# Patient Record
Sex: FEMALE | Race: WHITE
[De-identification: names, ages, dates, MRNs, and addresses within clinical notes are randomized per-mention and may not be internally consistent; named-entity substitution may affect disease eponyms.]

---

## 2018-06-04 ENCOUNTER — HOSPITAL ENCOUNTER (OUTPATIENT)
Dept: HOSPITAL 17 - PHED | Age: 43
Setting detail: OBSERVATION
LOS: 1 days | Discharge: HOME | End: 2018-06-05
Attending: SURGERY | Admitting: SURGERY
Payer: COMMERCIAL

## 2018-06-04 VITALS
SYSTOLIC BLOOD PRESSURE: 122 MMHG | HEART RATE: 92 BPM | RESPIRATION RATE: 16 BRPM | OXYGEN SATURATION: 97 % | DIASTOLIC BLOOD PRESSURE: 76 MMHG

## 2018-06-04 VITALS
SYSTOLIC BLOOD PRESSURE: 118 MMHG | HEART RATE: 87 BPM | OXYGEN SATURATION: 100 % | TEMPERATURE: 97.5 F | RESPIRATION RATE: 16 BRPM | DIASTOLIC BLOOD PRESSURE: 67 MMHG

## 2018-06-04 VITALS — WEIGHT: 124.34 LBS | BODY MASS INDEX: 19.52 KG/M2 | HEIGHT: 67 IN

## 2018-06-04 VITALS
HEART RATE: 69 BPM | DIASTOLIC BLOOD PRESSURE: 72 MMHG | RESPIRATION RATE: 18 BRPM | SYSTOLIC BLOOD PRESSURE: 120 MMHG | OXYGEN SATURATION: 100 %

## 2018-06-04 VITALS — HEART RATE: 115 BPM

## 2018-06-04 DIAGNOSIS — G47.00: ICD-10-CM

## 2018-06-04 DIAGNOSIS — N83.201: ICD-10-CM

## 2018-06-04 DIAGNOSIS — K35.80: Primary | ICD-10-CM

## 2018-06-04 DIAGNOSIS — F98.8: ICD-10-CM

## 2018-06-04 LAB
ALBUMIN SERPL-MCNC: 3.6 GM/DL (ref 3.4–5)
ALP SERPL-CCNC: 62 U/L (ref 45–117)
ALT SERPL-CCNC: 20 U/L (ref 10–53)
AST SERPL-CCNC: 14 U/L (ref 15–37)
BACTERIA #/AREA URNS HPF: (no result) /HPF
BASOPHILS # BLD AUTO: 0.1 TH/MM3 (ref 0–0.2)
BASOPHILS NFR BLD: 0.6 % (ref 0–2)
BILIRUB SERPL-MCNC: 0.4 MG/DL (ref 0.2–1)
BUN SERPL-MCNC: 10 MG/DL (ref 7–18)
CALCIUM SERPL-MCNC: 8.4 MG/DL (ref 8.5–10.1)
CHLORIDE SERPL-SCNC: 104 MEQ/L (ref 98–107)
COLOR UR: YELLOW
CREAT SERPL-MCNC: 0.56 MG/DL (ref 0.5–1)
EOSINOPHIL # BLD: 0 TH/MM3 (ref 0–0.4)
EOSINOPHIL NFR BLD: 0.1 % (ref 0–4)
ERYTHROCYTE [DISTWIDTH] IN BLOOD BY AUTOMATED COUNT: 13.3 % (ref 11.6–17.2)
GFR SERPLBLD BASED ON 1.73 SQ M-ARVRAT: 120 ML/MIN (ref 89–?)
GLUCOSE SERPL-MCNC: 125 MG/DL (ref 74–106)
GLUCOSE UR STRIP-MCNC: (no result) MG/DL
HCO3 BLD-SCNC: 24.7 MEQ/L (ref 21–32)
HCT VFR BLD CALC: 40 % (ref 35–46)
HGB BLD-MCNC: 13.4 GM/DL (ref 11.6–15.3)
HGB UR QL STRIP: (no result)
INR PPP: 1.1 RATIO
KETONES UR STRIP-MCNC: 40 MG/DL
LEUKOCYTE ESTERASE UR QL STRIP: (no result) /HPF (ref 0–5)
LYMPHOCYTES # BLD AUTO: 1 TH/MM3 (ref 1–4.8)
LYMPHOCYTES NFR BLD AUTO: 5.1 % (ref 9–44)
MCH RBC QN AUTO: 28.8 PG (ref 27–34)
MCHC RBC AUTO-ENTMCNC: 33.5 % (ref 32–36)
MCV RBC AUTO: 86.2 FL (ref 80–100)
MONOCYTE #: 0.3 TH/MM3 (ref 0–0.9)
MONOCYTES NFR BLD: 1.5 % (ref 0–8)
NEUTROPHILS # BLD AUTO: 19.1 TH/MM3 (ref 1.8–7.7)
NEUTROPHILS NFR BLD AUTO: 92.7 % (ref 16–70)
NITRITE UR QL STRIP: (no result)
PLATELET # BLD: 188 TH/MM3 (ref 150–450)
PMV BLD AUTO: 9 FL (ref 7–11)
PROT SERPL-MCNC: 6.7 GM/DL (ref 6.4–8.2)
PROTHROMBIN TIME: 10.7 SEC (ref 9.8–11.6)
RBC # BLD AUTO: 4.64 MIL/MM3 (ref 4–5.3)
RBC #/AREA URNS HPF: (no result) /HPF (ref 0–3)
SODIUM SERPL-SCNC: 137 MEQ/L (ref 136–145)
SP GR UR STRIP: (no result) (ref 1–1.03)
SQUAMOUS #/AREA URNS HPF: (no result) /HPF (ref 0–5)
URINE LEUKOCYTE ESTERASE: (no result)
WBC # BLD AUTO: 20.5 TH/MM3 (ref 4–11)

## 2018-06-04 PROCEDURE — 85610 PROTHROMBIN TIME: CPT

## 2018-06-04 PROCEDURE — 96375 TX/PRO/DX INJ NEW DRUG ADDON: CPT

## 2018-06-04 PROCEDURE — 00840 ANES IPER PX LOWER ABD NOS: CPT

## 2018-06-04 PROCEDURE — 96376 TX/PRO/DX INJ SAME DRUG ADON: CPT

## 2018-06-04 PROCEDURE — 83690 ASSAY OF LIPASE: CPT

## 2018-06-04 PROCEDURE — 84702 CHORIONIC GONADOTROPIN TEST: CPT

## 2018-06-04 PROCEDURE — 85730 THROMBOPLASTIN TIME PARTIAL: CPT

## 2018-06-04 PROCEDURE — 81001 URINALYSIS AUTO W/SCOPE: CPT

## 2018-06-04 PROCEDURE — 96365 THER/PROPH/DIAG IV INF INIT: CPT

## 2018-06-04 PROCEDURE — 99285 EMERGENCY DEPT VISIT HI MDM: CPT

## 2018-06-04 PROCEDURE — 44970 LAPAROSCOPY APPENDECTOMY: CPT

## 2018-06-04 PROCEDURE — 96361 HYDRATE IV INFUSION ADD-ON: CPT

## 2018-06-04 PROCEDURE — 94150 VITAL CAPACITY TEST: CPT

## 2018-06-04 PROCEDURE — 74177 CT ABD & PELVIS W/CONTRAST: CPT

## 2018-06-04 PROCEDURE — G0378 HOSPITAL OBSERVATION PER HR: HCPCS

## 2018-06-04 PROCEDURE — 88304 TISSUE EXAM BY PATHOLOGIST: CPT

## 2018-06-04 PROCEDURE — 80053 COMPREHEN METABOLIC PANEL: CPT

## 2018-06-04 PROCEDURE — 85025 COMPLETE CBC W/AUTO DIFF WBC: CPT

## 2018-06-04 RX ADMIN — PHENYTOIN SODIUM SCH MLS/HR: 50 INJECTION INTRAMUSCULAR; INTRAVENOUS at 14:20

## 2018-06-04 RX ADMIN — CLONIDINE HYDROCHLORIDE PRN MG: 0.1 INJECTION, SOLUTION EPIDURAL at 20:13

## 2018-06-04 RX ADMIN — PHENYTOIN SODIUM SCH MLS/HR: 50 INJECTION INTRAMUSCULAR; INTRAVENOUS at 18:25

## 2018-06-04 RX ADMIN — PHENYTOIN SODIUM SCH MLS/HR: 50 INJECTION INTRAMUSCULAR; INTRAVENOUS at 20:12

## 2018-06-04 NOTE — MH
cc:

Everette Schneider MD

****

 

 

DATE OF ADMISSION:

06/04/2018

 

REASON FOR ADMISSION:

Acute appendicitis.

 

HISTORY OF PRESENT ILLNESS:

The patient is a 42-year-old female who began to develop abdominal 

pain across the lower abdomen this morning.  It began in the 

periumbilical region and has now migrated to both lower abdomen.  The 

patient had nausea and emesis as well.  No change in bowel habits.  

She has no history of abdominal surgeries.  She takes Adderall for 

attention deficit disorder.  Last menstrual period, she has had 

intermittent spotting for the last 2 weeks.  She has had no previous 

surgeries.  She does not drink.  She does not smoke.  She does not use

other medications or substances.

 

ALLERGIES:

SHE HAS NO KNOWN ALLERGIES.

 

REVIEW OF SYSTEMS:

Negative except for GI, which is positive as indicated above.

 

PHYSICAL EXAMINATION:

GENERAL:  Exam reveals a  female, in no acute distress.

VITAL SIGNS:  /90, pulse 115, respirations 16, temperature 97.6,

100% saturation on room air.

HEENT:  Sclerae anicteric.

CHEST:  Clear to auscultation.

CARDIOVASCULAR:  Reveals tachycardia, without murmurs.

ABDOMEN:  Soft, with tenderness in both lower quadrants, with some 

guarding in the right lower quadrant.  There are no hernias noted.  

Pulses are intact.

NEUROLOGIC:  Nonfocal.

 

LABORATORY DATA:

Demonstrate WBCs of 20.5, hemoglobin is 13.4, platelets 188,000.  

Chemistries demonstrate BUN and creatinine of 10 and 0.56.  Potassium 

is 3.6.

 

IMAGING:

Demonstrates dilated appendix to 1 cm, with areas of increased density

representing appendicolith or inspissated stool.  This is consistent 

with a mild early appendicitis and a 2.2 cm right ovarian cyst is 

noted as well.

 

ASSESSMENT:

Early acute appendicitis, with right lower quadrant pain and guarding.

 

PLAN:

I have discussed with the patient and her significant other 

laparoscopic appendectomy versus antibiotic treatment.  Given her 

significant pain with an 8/10, I have recommended that she consider 

appendectomy and she is agreeable.  I have discussed risks of surgery 

including, but not limited to, bleeding, infection, leakage and 

adhesion formation, need for possible open procedure and drainage.  I 

have discussed remedies, consequences, alternatives and convalescence;

she vocalizes understanding and agrees to proceed.

 

 

__________________________________

MD JULIANA Gore/JILLIAN

D: 06/04/2018, 06:37 PM

T: 06/04/2018, 07:29 PM

Visit #: [id number]

Job #: [id number]

## 2018-06-04 NOTE — HHI.PR
cc:   Everette Schneider MD


__________________________________________________





Immediate Post Op Note


Procedure Date:


Jun 4, 2018


Pre Op Diagnosis:  


Acute appendicitis


Post Op Diagnosis:  


Same, without perforation


Surgeon:


Everette Schneider


Assistant(s):


BRAD Kimble CFA


Procedure:


Laparoscopic appendectomy


Complications:


None


Specimen(s) removed:


Appendix to pathology


Estimated blood loss:


<10 ml


Anesthesia:  General


Drains:  None


IVF (900 ml)


Patient to:  PACU


Patient Condition:  Good


Date/Time of Procedure:  SEE SURGICAL CARE RECORD











Everette Schneider MD Jun 4, 2018 18:25

## 2018-06-04 NOTE — PD
HPI


Chief Complaint:  Abdominal Pain


Time Seen by Provider:  11:29


Travel History


International Travel<30 days:  No


Contact w/Intl Traveler<30days:  No


Traveled to known affect area:  No





History of Present Illness


HPI


The patient was seen and examined in the presence of the nurse.  This patient 

complains of abdominal pain.  Location is periumbilical.  Duration is 4.5 

hours.  She has nausea and vomiting as well.  No fever.  No alleviating 

factors.  No exacerbating factors.  No history of abdominal surgeries.  Patient 

has ADD on Adderall.  She has had some light vaginal bleeding for 2-3 weeks 

which started after recently stopping her birth control pills.  Pain is rated 

as severe.





PFSH


Past Medical History


Hx Anticoagulant Therapy:  No


ADD:  Yes


Diminished Hearing:  No


Insomnia:  Yes


Tetanus Vaccination:  > 5 Years


Influenza Vaccination:  No


Pregnant?:  Unknown


LMP:  Int. spotting X's 2 weeks





Past Surgical History


Surgical History:  No Previous Surgery





Social History


Alcohol Use:  No


Tobacco Use:  No


Substance Use:  No





Allergies-Medications


(Allergen,Severity, Reaction):  


Coded Allergies:  


     No Known Allergies (Verified  Allergy, Unknown, 6/4/18)


Reported Meds & Prescriptions





Reported Meds & Active Scripts


Active








Review of Systems


General / Constitutional:  No: Fever


Eyes:  No: Visual changes


HENT:  No: Headaches


Cardiovascular:  No: Chest Pain or Discomfort


Respiratory:  No: Shortness of Breath


Gastrointestinal:  Positive: Nausea, Vomiting, Abdominal Pain


Genitourinary:  Positive: Vaginal Bleeding, No: Dysuria


Musculoskeletal:  No: Pain


Skin:  No Rash


Neurologic:  No: Weakness


Psychiatric:  No: Depression


Endocrine:  No: Polydipsia


Hematologic/Lymphatic:  No: Easy Bruising





Physical Exam


Narrative


GENERAL: Well-nourished, well-developed patient in abdominal pain .


SKIN: Focused skin assessment reveals no rash and nodules. Skin is Warm and dry.


HEAD: Atraumatic. Normocephalic. 


EYES: Pupils equal and round. No scleral icterus. No injection or drainage. 


ENT: No nasal bleeding or discharge.  Mucous membranes pink and moist.


NECK: Trachea midline. No JVD. 


CARDIOVASCULAR: Regular rate and rhythm.  No murmur appreciated.


RESPIRATORY: No accessory muscle use. Clear to auscultation. Breath sounds 

equal bilaterally. 


GASTROINTESTINAL: Abdomen soft, periumbilical tenderness is present without 

rebound or guarding , nondistended. Hepatic and splenic margins not palpable. 


MUSCULOSKELETAL: No obvious deformities. No clubbing.  No cyanosis.  No edema. 


NEUROLOGICAL: Awake and alert. No obvious cranial nerve deficits.  Motor 

grossly within normal limits. Normal speech.


PSYCHIATRIC: Appropriate mood and affect; insight and judgment normal.


\Pelvic: Scant dried blood in the vault.  No discharge.  No cervical motion 

tenderness.  No adnexal mass or tenderness.  No uterine tenderness.





Data


Data


Last Documented VS





Vital Signs








  Date Time  Temp Pulse Resp B/P (MAP) Pulse Ox O2 Delivery O2 Flow Rate FiO2


 


6/4/18 13:00  69 18 120/72 (88) 100 Room Air  


 


6/4/18 11:23 97.5       








Orders





 Orders


Complete Blood Count With Diff (6/4/18 11:36)


Comprehensive Metabolic Panel (6/4/18 11:36)


Lipase (6/4/18 11:36)


Prothrombin Time / Inr (Pt) (6/4/18 11:36)


Act Partial Throm Time (Ptt) (6/4/18 11:36)


Urinalysis - C+S If Indicated (6/4/18 11:36)


Ct Abd/Pel W Iv Contrast(Rout) (6/4/18 11:36)


Iv Access Insert/Monitor (6/4/18 11:36)


NPO (6/4/18 11:36)


Sodium Chlor 0.9% 1000 Ml Inj (Ns 1000 M (6/4/18 11:36)


Sodium Chloride 0.9% Flush (Ns Flush) (6/4/18 11:45)


Ondansetron  Odt (Zofran  Odt) (6/4/18 11:45)


Morphine Inj (Morphine Inj) (6/4/18 11:45)


Beta Hcg (Quant/Titer) (6/4/18 11:36)


Prochlorperazine Inj (Compazine Inj) (6/4/18 13:15)


Iohexol 350 Inj (Omnipaque 350 Inj) (6/4/18 13:35)


Admit Order (Ed Use Only) (6/4/18 14:00)





Labs





Laboratory Tests








Test


  6/4/18


12:38 6/4/18


13:59


 


White Blood Count 20.5 TH/MM3  


 


Red Blood Count 4.64 MIL/MM3  


 


Hemoglobin 13.4 GM/DL  


 


Hematocrit 40.0 %  


 


Mean Corpuscular Volume 86.2 FL  


 


Mean Corpuscular Hemoglobin 28.8 PG  


 


Mean Corpuscular Hemoglobin


Concent 33.5 % 


  


 


 


Red Cell Distribution Width 13.3 %  


 


Platelet Count 188 TH/MM3  


 


Mean Platelet Volume 9.0 FL  


 


Neutrophils (%) (Auto) 92.7 %  


 


Lymphocytes (%) (Auto) 5.1 %  


 


Monocytes (%) (Auto) 1.5 %  


 


Eosinophils (%) (Auto) 0.1 %  


 


Basophils (%) (Auto) 0.6 %  


 


Neutrophils # (Auto) 19.1 TH/MM3  


 


Lymphocytes # (Auto) 1.0 TH/MM3  


 


Monocytes # (Auto) 0.3 TH/MM3  


 


Eosinophils # (Auto) 0.0 TH/MM3  


 


Basophils # (Auto) 0.1 TH/MM3  


 


CBC Comment AUTO DIFF  


 


Differential Comment


  AUTO DIFF


CONFIRMED 


 


 


Platelet Estimate NORMAL  


 


Platelet Morphology Comment NORMAL  


 


Red Cell Morphology Comment NORMAL  


 


Prothrombin Time 10.7 SEC  


 


Prothromb Time International


Ratio 1.1 RATIO 


  


 


 


Activated Partial


Thromboplast Time 28.6 SEC 


  


 


 


Blood Urea Nitrogen 10 MG/DL  


 


Creatinine 0.56 MG/DL  


 


Random Glucose 125 MG/DL  


 


Total Protein 6.7 GM/DL  


 


Albumin 3.6 GM/DL  


 


Calcium Level 8.4 MG/DL  


 


Alkaline Phosphatase 62 U/L  


 


Aspartate Amino Transf


(AST/SGOT) 14 U/L 


  


 


 


Alanine Aminotransferase


(ALT/SGPT) 20 U/L 


  


 


 


Total Bilirubin 0.4 MG/DL  


 


Sodium Level 137 MEQ/L  


 


Potassium Level 3.6 MEQ/L  


 


Chloride Level 104 MEQ/L  


 


Carbon Dioxide Level 24.7 MEQ/L  


 


Anion Gap 8 MEQ/L  


 


Estimat Glomerular Filtration


Rate 120 ML/MIN 


  


 


 


Lipase 80 U/L  


 


Human Chorionic Gonadotropin,


Quant LESS THAN 1


MIU/ML 


 











MDM


Medical Decision Making


Medical Screen Exam Complete:  Yes


Emergency Medical Condition:  Yes


Medical Record Reviewed:  Yes


Differential Diagnosis


Appendicitis, ectopic, colitis


Narrative Course


I have reviewed the patient's electronic medical record.





IV placed and labs sent


Pelvic exam suggests that the cause is not GYN related


CBC shows significant leukocytosis of 20,000





Metabolic studies are normal


Beta hCG is negative


Urine is pending





CT scan suggests early appendicitis.


This would go along with her periumbilical abdominal pain rather than right 

lower quadrant





Case reviewed with Dr. Schneider who will admit for acute appendicitis.  I gave a 

dose of Unasyn


She is n.p.o.





Diagnosis





 Primary Impression:  


 Acute appendicitis


 Qualified Codes:  K35.80 - Unspecified acute appendicitis





Admitting Information


Admitting Physician Requests:  Admit











Willy Grant MD Jun 4, 2018 11:46

## 2018-06-04 NOTE — MP
cc:

Everette Schneider MD

****

 

 

DATE OF OPERATION:

06/04/2018

 

PROCEDURE PERFORMED:

Laparoscopic appendectomy.

 

PREOPERATIVE DIAGNOSIS:

Acute appendicitis.

 

POSTOPERATIVE DIAGNOSIS:

Acute appendicitis without perforation.

 

ANESTHESIA:

General endotracheal.

 

SURGEON:

Everette Schneider MD

 

ESTIMATED BLOOD LOSS:

Less than 10 mL

 

FLUIDS:

900 mL crystalloid.

 

COMPLICATIONS:

None.

 

DRAINS:

None.

 

SPECIMENS:

Appendix to pathology.

 

PROCEDURE IN DETAIL:

The patient was taken to the operating room and placed on the 

operating table in the supine position.  After an adequate level of 

general endotracheal anesthesia was achieved, the abdomen was prepped 

and draped in the usual fashion.  Timeout was taken confirming the 

correct patient, site and procedure to be performed.

 

Skin and subcutaneous tissue was infiltrated with local anesthetic, an

incision made in the umbilicus and carried through the fascia sharply.

 The peritoneal cavity was directly visualized.  A 12 mm balloon 

trocar was inserted and the balloon inflated.  The abdomen was 

insufflated.  The patient was placed in Trendelenburg position.  A 5 

mm 30 degree laparoscope was inserted.  Two 5 mm trocars were then 

placed with the first in the right lower quadrant and the second in 

the suprapubic region, midway between the pubis and the umbilical 

port.  Both entered the abdominal cavity under direct vision 

uneventfully.  The appendix was then rotated up and was seen to be 

dilated and slightly inflamed.  The mesoappendix was divided with the 

Harmonic scalpel.  Dissection was carried back to the base of the 

appendix.  A 0 PDS Endoloop was slipped over the appendix and cinched 

down at the base.  While dividing the appendix 1 cm distal to this, a 

small piece of stool extruded.  This was taken with the appendix and 

placed into an EndoCatch device, while observing via the right lower 

quadrant trocar site with the camera.  This was taken out via the 

umbilical port site and passed off the table.  The abdomen was then 

irrigated and all irrigation aspirated from the abdominal cavity.  The

appendectomy stump was seen to be clean and dry.  There was no 

bleeding noted from the mesoappendix.  The cul-de-sac was examined and

seen to be clean and dry as well.  There were no suspicious lesions or

other findings of significance.  Insufflation was then discontinued 

and the 5 mm trocars were removed under direct vision.  No bleeding 

was noted from the trocar sites.  The laparoscope and umbilical port 

were then removed.  The fascia was closed in the umbilicus with 0 

Vicryl suture in both a simple interrupted and figure-of-eight 

fashion.  The remaining local anesthetic was injected into each of the

trocar sites.  The skin was closed at each of the trocar sites with 

4-0 Vicryl in an interrupted buried fashion.  All trocar sites were 

dressed with Steri-Strips.  The patient was extubated and taken back 

to the recovery room in stable condition.  She tolerated the procedure

well.

 

 

__________________________________

MD JULIANA Gore/JILLIAN

D: 06/04/2018, 06:40 PM

T: 06/04/2018, 08:00 PM

Visit #: [id number]

Job #: [id number]

## 2018-06-04 NOTE — RADRPT
EXAM DATE:  6/4/2018 1:34 PM EDT

AGE/SEX:        42 years / Female



INDICATIONS:  Mid abdominal pain with nausea and vomiting.



CLINICAL DATA:  This is the patient's initial encounter. Patient reports that signs and symptoms have
 been present for 1 day and indicates a pain score of 4/10. 

                                                                          

MEDICAL/SURGICAL HISTORY:       None. None.



ORAL CONTRAST:   No oral contrast ingested.



RADIATION DOSE:  5.63 CTDI (mGy)









COMPARISON:      No prior [facility] exams available for comparison.



TECHNIQUE:  Multiple contiguous axial images were obtained through the abdomen and pelvis following b
olus infusion of  90 ml Omnipaque 350 (iohexol)  nonionic water-soluble contrast as a single exam dos
e. No oral contrast ingested.  Using automated exposure control and adjustment of the mA and/or kV ac
cording to patient size, the radiation dose was kept as low as reasonably achievable to obtain optima
l diagnostic quality images.



FINDINGS: 

Lower Lungs: The visualized lower lungs are clear.

Liver: Punctate, subcentimeter hypodensity the junction of the left and right hepatic lobe probably r
epresenting a small cyst. There is no dilation of the biliary tree.

Spleen:  Homogeneous density without enlargement.

Pancreas:  Unremarkable without mass or calcification.

Kidneys:  Normal in size and shape. No evidence of mass or hydronephrosis.

Adrenal Glands:   Unremarkable.

Aorta:   The aorta and proximal iliac vessels are grossly unremarkable without aneurysmal dilation.

Bowel/Mesentery:  The bowel loops are grossly unremarkable. The cecum and sigmoid colon have a normal
 configuration. The appendix is rather long with an increased diameter measuring upwards of 9.5 mm. T
here are also scattered areas of either inspissated stool, small appendicoliths or previous contrast 
in the appendiceal lumen

Abdominal Wall:  Intact.

Retroperitoneum:  No evidence of adenopathy in the retrocrural, para-aortic, or deep pelvic regions.

Bladder:  Contours are smooth.

Reproductive Organs:  Retroflexed uterus. 2.2 cm cyst in the right ovary.

Inguinal:  The inguinal region is unremarkable without evidence of adenopathy.

Bony Structures:  Unremarkable.







CONCLUSION:

1.  Appendix is abnormally thickened measuring 9.5 mm in diameter with scattered areas of increased d
ensity possibly representing small appendicoliths or inspissated stool. Findings are characteristic o
f a mild, early appendicitis.

2.  2.2 cm right ovarian cyst



Electronically signed by: Shawn Scott MD  6/4/2018 1:46 PM EDT

## 2018-06-05 VITALS
HEART RATE: 87 BPM | TEMPERATURE: 97.1 F | OXYGEN SATURATION: 100 % | RESPIRATION RATE: 20 BRPM | SYSTOLIC BLOOD PRESSURE: 140 MMHG | DIASTOLIC BLOOD PRESSURE: 74 MMHG

## 2018-06-05 VITALS
TEMPERATURE: 97.1 F | OXYGEN SATURATION: 99 % | SYSTOLIC BLOOD PRESSURE: 135 MMHG | DIASTOLIC BLOOD PRESSURE: 82 MMHG | HEART RATE: 94 BPM | RESPIRATION RATE: 19 BRPM

## 2018-06-05 VITALS
OXYGEN SATURATION: 98 % | TEMPERATURE: 98.4 F | HEART RATE: 104 BPM | DIASTOLIC BLOOD PRESSURE: 91 MMHG | SYSTOLIC BLOOD PRESSURE: 135 MMHG | RESPIRATION RATE: 20 BRPM

## 2018-06-05 VITALS — RESPIRATION RATE: 18 BRPM

## 2018-06-05 LAB
BASOPHILS # BLD AUTO: 0.1 TH/MM3 (ref 0–0.2)
BASOPHILS # BLD AUTO: 0.3 TH/MM3 (ref 0–0.2)
BASOPHILS NFR BLD: 0.5 % (ref 0–2)
BASOPHILS NFR BLD: 1.5 % (ref 0–2)
EOSINOPHIL # BLD: 0 TH/MM3 (ref 0–0.4)
EOSINOPHIL # BLD: 0.2 TH/MM3 (ref 0–0.4)
EOSINOPHIL NFR BLD: 0.2 % (ref 0–4)
EOSINOPHIL NFR BLD: 0.9 % (ref 0–4)
ERYTHROCYTE [DISTWIDTH] IN BLOOD BY AUTOMATED COUNT: 13 % (ref 11.6–17.2)
ERYTHROCYTE [DISTWIDTH] IN BLOOD BY AUTOMATED COUNT: 13.5 % (ref 11.6–17.2)
HCT VFR BLD CALC: 35.9 % (ref 35–46)
HCT VFR BLD CALC: 37.9 % (ref 35–46)
HGB BLD-MCNC: 12.4 GM/DL (ref 11.6–15.3)
HGB BLD-MCNC: 12.6 GM/DL (ref 11.6–15.3)
LYMPHOCYTES # BLD AUTO: 0.9 TH/MM3 (ref 1–4.8)
LYMPHOCYTES # BLD AUTO: 1.4 TH/MM3 (ref 1–4.8)
LYMPHOCYTES NFR BLD AUTO: 6.1 % (ref 9–44)
LYMPHOCYTES NFR BLD AUTO: 7.3 % (ref 9–44)
MCH RBC QN AUTO: 28.5 PG (ref 27–34)
MCH RBC QN AUTO: 29.4 PG (ref 27–34)
MCHC RBC AUTO-ENTMCNC: 33.3 % (ref 32–36)
MCHC RBC AUTO-ENTMCNC: 34.4 % (ref 32–36)
MCV RBC AUTO: 85.3 FL (ref 80–100)
MCV RBC AUTO: 85.5 FL (ref 80–100)
MONOCYTE #: 0.1 TH/MM3 (ref 0–0.9)
MONOCYTE #: 0.6 TH/MM3 (ref 0–0.9)
MONOCYTES NFR BLD: 1 % (ref 0–8)
MONOCYTES NFR BLD: 3 % (ref 0–8)
NEUTROPHILS # BLD AUTO: 13.8 TH/MM3 (ref 1.8–7.7)
NEUTROPHILS # BLD AUTO: 16.5 TH/MM3 (ref 1.8–7.7)
NEUTROPHILS NFR BLD AUTO: 87.3 % (ref 16–70)
NEUTROPHILS NFR BLD AUTO: 92.2 % (ref 16–70)
PLATELET # BLD: 190 TH/MM3 (ref 150–450)
PLATELET # BLD: 219 TH/MM3 (ref 150–450)
PMV BLD AUTO: 8.9 FL (ref 7–11)
PMV BLD AUTO: 9.5 FL (ref 7–11)
RBC # BLD AUTO: 4.21 MIL/MM3 (ref 4–5.3)
RBC # BLD AUTO: 4.43 MIL/MM3 (ref 4–5.3)
WBC # BLD AUTO: 14.9 TH/MM3 (ref 4–11)
WBC # BLD AUTO: 19 TH/MM3 (ref 4–11)

## 2018-06-05 RX ADMIN — HYDROCODONE BITARTRATE AND ACETAMINOPHEN PRN TAB: 5; 325 TABLET ORAL at 05:51

## 2018-06-05 RX ADMIN — HYDROCODONE BITARTRATE AND ACETAMINOPHEN PRN TAB: 5; 325 TABLET ORAL at 11:21

## 2018-06-05 RX ADMIN — CLONIDINE HYDROCHLORIDE PRN MG: 0.1 INJECTION, SOLUTION EPIDURAL at 08:58

## 2018-06-05 RX ADMIN — ONDANSETRON PRN MG: 2 INJECTION, SOLUTION INTRAMUSCULAR; INTRAVENOUS at 00:50

## 2018-06-05 RX ADMIN — HYDROCODONE BITARTRATE AND ACETAMINOPHEN PRN TAB: 5; 325 TABLET ORAL at 15:34

## 2018-06-05 RX ADMIN — HYDROCODONE BITARTRATE AND ACETAMINOPHEN PRN TAB: 5; 325 TABLET ORAL at 00:50

## 2018-06-05 RX ADMIN — ONDANSETRON PRN MG: 2 INJECTION, SOLUTION INTRAMUSCULAR; INTRAVENOUS at 08:56

## 2018-06-05 RX ADMIN — PHENYTOIN SODIUM SCH MLS/HR: 50 INJECTION INTRAMUSCULAR; INTRAVENOUS at 02:42

## 2018-06-05 RX ADMIN — PHENYTOIN SODIUM SCH MLS/HR: 50 INJECTION INTRAMUSCULAR; INTRAVENOUS at 03:24

## 2018-06-05 RX ADMIN — CLONIDINE HYDROCHLORIDE PRN MG: 0.1 INJECTION, SOLUTION EPIDURAL at 03:33

## 2018-06-05 RX ADMIN — CLONIDINE HYDROCHLORIDE PRN MG: 0.1 INJECTION, SOLUTION EPIDURAL at 13:26

## 2018-06-05 NOTE — HHI.PR
__________________________________________________





Subjective


Subjective Notes


Resting in bed 


Painful but "different type pain today"





Objective


Vitals/I&O





Vital Signs








  Date Time  Temp Pulse Resp B/P (MAP) Pulse Ox O2 Delivery O2 Flow Rate FiO2


 


6/5/18 09:58   18     


 


6/5/18 08:00 97.1 94  135/82 (99) 99   


 


6/4/18 19:00      Room Air  








Labs





Laboratory Tests








Test


  6/4/18


12:38 6/4/18


13:59 6/5/18


08:05


 


White Blood Count 20.5   19.0 


 


Red Blood Count 4.64   4.43 


 


Hemoglobin 13.4   12.6 


 


Hematocrit 40.0   37.9 


 


Mean Corpuscular Volume 86.2   85.5 


 


Mean Corpuscular Hemoglobin 28.8   28.5 


 


Mean Corpuscular Hemoglobin


Concent 33.5 


  


  33.3 


 


 


Red Cell Distribution Width 13.3   13.0 


 


Platelet Count 188   190 


 


Mean Platelet Volume 9.0   8.9 


 


Neutrophils (%) (Auto) 92.7   87.3 


 


Lymphocytes (%) (Auto) 5.1   7.3 


 


Monocytes (%) (Auto) 1.5   3.0 


 


Eosinophils (%) (Auto) 0.1   0.9 


 


Basophils (%) (Auto) 0.6   1.5 


 


Neutrophils # (Auto) 19.1   16.5 


 


Lymphocytes # (Auto) 1.0   1.4 


 


Monocytes # (Auto) 0.3   0.6 


 


Eosinophils # (Auto) 0.0   0.2 


 


Basophils # (Auto) 0.1   0.3 


 


CBC Comment AUTO DIFF   DIFF FINAL 


 


Differential Comment


  AUTO DIFF


CONFIRMED 


   


 


 


Platelet Estimate NORMAL   


 


Platelet Morphology Comment NORMAL   


 


Red Cell Morphology Comment NORMAL   


 


Prothrombin Time 10.7   


 


Prothromb Time International


Ratio 1.1 


  


  


 


 


Activated Partial


Thromboplast Time 28.6 


  


  


 


 


Blood Urea Nitrogen 10   


 


Creatinine 0.56   


 


Random Glucose 125   


 


Total Protein 6.7   


 


Albumin 3.6   


 


Calcium Level 8.4   


 


Alkaline Phosphatase 62   


 


Aspartate Amino Transf


(AST/SGOT) 14 


  


  


 


 


Alanine Aminotransferase


(ALT/SGPT) 20 


  


  


 


 


Total Bilirubin 0.4   


 


Sodium Level 137   


 


Potassium Level 3.6   


 


Chloride Level 104   


 


Carbon Dioxide Level 24.7   


 


Anion Gap 8   


 


Estimat Glomerular Filtration


Rate 120 


  


  


 


 


Lipase 80   


 


Human Chorionic Gonadotropin,


Quant LESS THAN 1 


  


  


 


 


Urine Collection Type    


 


Urine Color  YELLOW  


 


Urine Turbidity  CLEAR  


 


Urine pH  6.5  


 


Urine Specific Gravity


  


  LESS/EQUAL


1.005 


 


 


Urine Protein  NEG  


 


Urine Glucose (UA)  NEG  


 


Urine Ketones  40  


 


Urine Occult Blood  TRACE  


 


Urine Nitrite  NEG  


 


Urine Bilirubin  NEG  


 


Urine Urobilinogen  0.2  


 


Urine Leukocyte Esterase  NEG  


 


Urine RBC  0-3  


 


Urine WBC  0-2  


 


Urine Squamous Epithelial


Cells 


  0-5 


  


 


 


Urine Bacteria  NONE  


 


Microscopic Urinalysis Comment


  


  CULT NOT


INDICATED 


 








Cardiovascular:  Regular


Lungs:  Clear


Abdomen:  Other (lap sites without any redness; umbilical site with mildly 

blood drainage; RLQ tenderness ), Post-op tenderness


Extremities:  No edema





A/P


Assessment and Plan


42 year old female POD1 non perforated appendicitis 





-Regular diet


-WBC remain elevated; continue Unasyn and recheck CBC at 1400


-Pain control


-Depending on WBC ---DC today vs tomorrow 


-Dr. Schneider to see this evening 


-Discussed with RN Berto and  at bedside 


===============================================


Attending Note - Dr. Schneider





Abdominal pain improved


WBC's repeated, down to 15.9k


Ok for cischarge


F/U my office one week





The exam, history, and the medical decision-making described in the above note 

were completed with the assistance of the mid-level provider. I reviewed and 

agree with the findings presented.  I attest that I had a face-to-face 

encounter with the patient on the same day, and personally performed and 

documented my assessment and findings in the medical record.











Willa MukherjeeP/First Assist ARNP Jun 5, 2018 11:48


Everette Schneider MD Jun 5, 2018 19:33

## 2018-06-14 ENCOUNTER — HOSPITAL ENCOUNTER (EMERGENCY)
Dept: HOSPITAL 17 - PHED | Age: 43
Discharge: HOME | End: 2018-06-14
Payer: COMMERCIAL

## 2018-06-14 VITALS — WEIGHT: 123.46 LBS | BODY MASS INDEX: 24.24 KG/M2 | HEIGHT: 60 IN

## 2018-06-14 VITALS
SYSTOLIC BLOOD PRESSURE: 127 MMHG | DIASTOLIC BLOOD PRESSURE: 85 MMHG | HEART RATE: 105 BPM | RESPIRATION RATE: 16 BRPM | OXYGEN SATURATION: 100 %

## 2018-06-14 VITALS — SYSTOLIC BLOOD PRESSURE: 124 MMHG | DIASTOLIC BLOOD PRESSURE: 85 MMHG

## 2018-06-14 VITALS
RESPIRATION RATE: 16 BRPM | SYSTOLIC BLOOD PRESSURE: 120 MMHG | DIASTOLIC BLOOD PRESSURE: 81 MMHG | HEART RATE: 102 BPM | TEMPERATURE: 97.6 F | OXYGEN SATURATION: 99 %

## 2018-06-14 DIAGNOSIS — G47.00: ICD-10-CM

## 2018-06-14 DIAGNOSIS — B96.20: ICD-10-CM

## 2018-06-14 DIAGNOSIS — Z90.49: ICD-10-CM

## 2018-06-14 DIAGNOSIS — B96.89: ICD-10-CM

## 2018-06-14 DIAGNOSIS — T81.4XXA: Primary | ICD-10-CM

## 2018-06-14 DIAGNOSIS — L02.216: ICD-10-CM

## 2018-06-14 LAB
BASOPHILS # BLD AUTO: 0.3 TH/MM3 (ref 0–0.2)
BASOPHILS NFR BLD: 2.2 % (ref 0–2)
BUN SERPL-MCNC: 6 MG/DL (ref 7–18)
CALCIUM SERPL-MCNC: 9.3 MG/DL (ref 8.5–10.1)
CHLORIDE SERPL-SCNC: 101 MEQ/L (ref 98–107)
CREAT SERPL-MCNC: 0.58 MG/DL (ref 0.5–1)
EOSINOPHIL # BLD: 0.1 TH/MM3 (ref 0–0.4)
EOSINOPHIL NFR BLD: 0.8 % (ref 0–4)
ERYTHROCYTE [DISTWIDTH] IN BLOOD BY AUTOMATED COUNT: 13.3 % (ref 11.6–17.2)
GFR SERPLBLD BASED ON 1.73 SQ M-ARVRAT: 115 ML/MIN (ref 89–?)
GLUCOSE SERPL-MCNC: 88 MG/DL (ref 74–106)
HCO3 BLD-SCNC: 28.3 MEQ/L (ref 21–32)
HCT VFR BLD CALC: 40.1 % (ref 35–46)
HGB BLD-MCNC: 13.4 GM/DL (ref 11.6–15.3)
LYMPHOCYTES # BLD AUTO: 1.9 TH/MM3 (ref 1–4.8)
LYMPHOCYTES NFR BLD AUTO: 14.8 % (ref 9–44)
MCH RBC QN AUTO: 28.9 PG (ref 27–34)
MCHC RBC AUTO-ENTMCNC: 33.3 % (ref 32–36)
MCV RBC AUTO: 86.9 FL (ref 80–100)
MONOCYTE #: 0.6 TH/MM3 (ref 0–0.9)
MONOCYTES NFR BLD: 5 % (ref 0–8)
NEUTROPHILS # BLD AUTO: 10.1 TH/MM3 (ref 1.8–7.7)
NEUTROPHILS NFR BLD AUTO: 77.2 % (ref 16–70)
PLATELET # BLD: 440 TH/MM3 (ref 150–450)
PMV BLD AUTO: 7.6 FL (ref 7–11)
RBC # BLD AUTO: 4.62 MIL/MM3 (ref 4–5.3)
SODIUM SERPL-SCNC: 136 MEQ/L (ref 136–145)
WBC # BLD AUTO: 13 TH/MM3 (ref 4–11)

## 2018-06-14 PROCEDURE — 85025 COMPLETE CBC W/AUTO DIFF WBC: CPT

## 2018-06-14 PROCEDURE — 74177 CT ABD & PELVIS W/CONTRAST: CPT

## 2018-06-14 PROCEDURE — 80048 BASIC METABOLIC PNL TOTAL CA: CPT

## 2018-06-14 PROCEDURE — 99284 EMERGENCY DEPT VISIT MOD MDM: CPT

## 2018-06-14 PROCEDURE — 87070 CULTURE OTHR SPECIMN AEROBIC: CPT

## 2018-06-14 PROCEDURE — 87186 SC STD MICRODIL/AGAR DIL: CPT

## 2018-06-14 PROCEDURE — 87077 CULTURE AEROBIC IDENTIFY: CPT

## 2018-06-14 NOTE — RADRPT
EXAM DATE:  6/14/2018 9:58 AM EDT

AGE/SEX:        42 years / Female



INDICATIONS:  Drainage from umbilicus wound today.  Post appendectomy x 10 days. Evaluate for abscess
.



CLINICAL DATA:  This is the patient's initial encounter. Patient reports that signs and symptoms have
 been present for 1 day and indicates a pain score of 6/10. 

                                                                          

MEDICAL/SURGICAL HISTORY:       None. Appendectomy.



ORAL CONTRAST:   No oral contrast ingested.



RADIATION DOSE:  6.31 CTDI (mGy)









COMPARISON:      [facility], CT ABDOMEN & PELVIS W CONTRAST, 6/4/2018.  . 





TECHNIQUE:  Multiple contiguous axial images were obtained through the abdomen and pelvis following b
olus infusion of  90 ml Omnipaque 350 (iohexol)  nonionic water-soluble contrast as a single exam dos
e. No oral contrast ingested.  Using automated exposure control and adjustment of the mA and/or kV ac
cording to patient size, the radiation dose was kept as low as reasonably achievable to obtain optima
l diagnostic quality images.



FINDINGS: 

Lower Lungs: The visualized lower lungs are clear.

Liver: The liver has a homogeneous density without space-occupying lesion. There is no dilation of th
e biliary tree.

Spleen:  Homogeneous density without enlargement.

Pancreas:  Unremarkable without mass or calcification.

Kidneys:  The renal collecting systems are mildly distended. Kidneys are otherwise stable.

Adrenal Glands:   Unremarkable.

Aorta:   The aorta and proximal iliac vessels are grossly unremarkable without aneurysmal dilation.

Bowel/Mesentery:  The bowel loops are grossly unremarkable. The cecum and sigmoid colon have a normal
 configuration. 

Abdominal Wall:  A subcutaneous fluid collection is identified inferiorly adjacent to the umbilicus. 
It measures 2.1 x 3.4 x 2.5 cm in size. Reactive inflammatory changes are identified in the anterior 
abdominal wall surrounding the umbilicus.

Retroperitoneum:  No evidence of adenopathy in the retrocrural, para-aortic, or deep pelvic regions.

Bladder:  Markedly distended

Reproductive Organs:  No abnormal masses or calcifications seen.

Inguinal:  The inguinal region is unremarkable without evidence of adenopathy.

Bony Structures:  Unremarkable.







CONCLUSION:

1.  3.5 cm fluid collection adjacent to the umbilicus characteristic of a small abscess as clinically
 suspected.

2.  Markedly distended urinary bladder with mild bilateral hydronephrosis.

3.  Otherwise stable exam.



Electronically signed by: Crescencio John MD  6/14/2018 10:25 AM EDT